# Patient Record
Sex: FEMALE | Race: WHITE | NOT HISPANIC OR LATINO | Employment: UNEMPLOYED | URBAN - METROPOLITAN AREA
[De-identification: names, ages, dates, MRNs, and addresses within clinical notes are randomized per-mention and may not be internally consistent; named-entity substitution may affect disease eponyms.]

---

## 2018-02-06 ENCOUNTER — TELEPHONE (OUTPATIENT)
Dept: SMOKING CESSATION | Facility: CLINIC | Age: 48
End: 2018-02-06

## 2018-02-06 NOTE — TELEPHONE ENCOUNTER
Called patient to offer services through [facility]. No answer, left message to call back at [phone] if interested.

## 2018-10-15 ENCOUNTER — TELEPHONE (OUTPATIENT)
Dept: ORTHOPEDICS | Facility: CLINIC | Age: 48
End: 2018-10-15

## 2018-10-15 NOTE — TELEPHONE ENCOUNTER
----- Message from Nicole Leary sent at 10/15/2018  4:52 PM CDT -----  Contact: self  Patient needs first available appointment due to [facility] 10/15/18 dx lump to left leg. Please call patient at [phone]. Thanks!

## 2018-10-15 NOTE — TELEPHONE ENCOUNTER
Spoke with patient regarding pain and bump to foot. Patient stated that she has a bump on her foot as the result of her dog stepping on her foot 1 month ago. Bump has been present since incident and is painful. Appointment with Dr De Souza has been set. Patient indicated understanding of information provided to her. No further issues discussed.

## 2019-01-08 ENCOUNTER — TELEPHONE (OUTPATIENT)
Dept: ORTHOPEDICS | Facility: CLINIC | Age: 49
End: 2019-01-08

## 2019-01-08 DIAGNOSIS — M25.572 CHRONIC PAIN OF LEFT ANKLE: Primary | ICD-10-CM

## 2019-01-08 DIAGNOSIS — G89.29 CHRONIC PAIN OF LEFT ANKLE: Primary | ICD-10-CM

## 2019-01-08 NOTE — TELEPHONE ENCOUNTER
Patient coming for Left ankle pain. Advised new images would need to be completed prior. Patient verbalized understanding. No other issues discussed

## 2019-01-24 ENCOUNTER — OFFICE VISIT (OUTPATIENT)
Dept: ORTHOPEDICS | Facility: CLINIC | Age: 49
End: 2019-01-24
Payer: MEDICAID

## 2019-01-24 VITALS
DIASTOLIC BLOOD PRESSURE: 78 MMHG | BODY MASS INDEX: 24.13 KG/M2 | SYSTOLIC BLOOD PRESSURE: 127 MMHG | HEART RATE: 87 BPM | WEIGHT: 153.75 LBS | HEIGHT: 67 IN

## 2019-01-24 DIAGNOSIS — M25.571 ACUTE RIGHT ANKLE PAIN: Primary | ICD-10-CM

## 2019-01-24 PROCEDURE — 99213 OFFICE O/P EST LOW 20 MIN: CPT | Mod: PBBFAC,PN | Performed by: ORTHOPAEDIC SURGERY

## 2019-01-24 PROCEDURE — 99204 PR OFFICE/OUTPT VISIT, NEW, LEVL IV, 45-59 MIN: ICD-10-PCS | Mod: S$PBB,,, | Performed by: ORTHOPAEDIC SURGERY

## 2019-01-24 PROCEDURE — 99999 PR PBB SHADOW E&M-EST. PATIENT-LVL III: ICD-10-PCS | Mod: PBBFAC,,, | Performed by: ORTHOPAEDIC SURGERY

## 2019-01-24 PROCEDURE — 99999 PR PBB SHADOW E&M-EST. PATIENT-LVL III: CPT | Mod: PBBFAC,,, | Performed by: ORTHOPAEDIC SURGERY

## 2019-01-24 PROCEDURE — 99204 OFFICE O/P NEW MOD 45 MIN: CPT | Mod: S$PBB,,, | Performed by: ORTHOPAEDIC SURGERY

## 2019-01-24 NOTE — LETTER
January 28, 2019      Edmond Morgan MD  [address]  [address]  [facility] [address]           [facility]  Orthopedics  [address] [place], [facility] [address]  [facility] [address]  Phone: [phone]  Fax: [phone]          Patient: Giovana Sue   MR Number: [id number]   YOB: 1970   Date of Visit: 1/24/2019       Dear Dr. Edmond Morgan:    Thank you for referring Giovana Sue to me for evaluation. Attached you will find relevant portions of my assessment and plan of care.    If you have questions, please do not hesitate to call me. I look forward to following Giovana Sue along with you.    Sincerely,    Oumar De Souza MD    Enclosure  CC:  No Recipients    If you would like to receive this communication electronically, please contact [email or web address] or [phone] to request more information on [employer] Link access.    For providers and/or their staff who would like to refer a patient to [facility], please contact us through our one-stop-shop provider referral line, [facility], at [phone].    If you feel you have received this communication in error or would no longer like to receive these types of communications, please e-mail [email or web address]

## 2019-02-14 NOTE — PROGRESS NOTES
"Orthopaedic Surgery History and Physical     History of present illness:   Giovana Sue is a 48 y.o. female who presents with RIGHT ankle pain.    Allergies:   Review of patient's allergies indicates:   Allergen Reactions    Darvocet a500 [propoxyphene n-acetaminophen] Nausea And Vomiting    Tylenol [acetaminophen] Hives       Past medical history:   Past Medical History:   Diagnosis Date    Arthritis     Dry eyes     Seizures     dx age 18-20's    Substance abuse     cocaine,crystal       Past surgical history:  Past Surgical History:   Procedure Laterality Date     SECTION      HERNIA REPAIR      right inguinal hernia    right rotatot cuff         Social history:   Reviewed per EPIC history for tobacco or alcohol use     Medications:    Current Outpatient Medications:     methadone (METHADOSE) 40 mg disintegrating tablet, Take 90 mg by mouth once daily. , Disp: , Rfl:     etodolac (LODINE) 200 MG Cap, Take 1 capsule (200 mg total) by mouth 3 (three) times daily., Disp: 30 capsule, Rfl: 0    Review of systems:  Denies chest pain, palpitations, shortness of breath.   Denies excessive thirst, urination or heat or cold intolerance.   Denies nausea, vomiting, melena or hematochezia.    Denies fever, chills, night sweats, weight loss.    Denies dysuria or hematuria.   Denies history of anxiety or depression.   Denies any skin abnormalities or rash.   Denies upper or lower extremity paresthesias or lightheadedness.    Denies cough, shortness of breath or hemoptysis.     Physical Exam:   Vitals:    19 1019   BP: 127/78   Pulse: 87   Weight: 69.7 kg (153 lb 12.3 oz)   Height: 5' 7" (1.702 m)     Awake/alert/oriented x3, No acute distress, Afebrile, Vital signs stable  Normocephalic, Atraumatic  Heart is beating at normal rate  Good inspiratory effort with unlaboured breathing  Abdomen soft/nondistended/nontender    Right lower extremity  Motor intact L2-S1  Sensation intact L2-S2  2+ " popliteal/dorsalis pedis/posterior tibial pulses  <2s CR refill to digits          Imaging:  Radiographs of the RIGHT ankle demonstrate no fracture/dislocation    Assessment:   48 y.o. female with RIGHT ankle pain    Plan:   Activity as tolerated  RTC prmichael De Souza MD  [facility]

## 2023-07-26 ENCOUNTER — HOSPITAL ENCOUNTER (OUTPATIENT)
Dept: RADIOLOGY | Facility: HOSPITAL | Age: 53
Discharge: HOME | End: 2023-07-26
Attending: NURSE PRACTITIONER
Payer: MEDICAID

## 2023-07-26 DIAGNOSIS — Z12.31 ENCOUNTER FOR SCREENING MAMMOGRAM FOR MALIGNANT NEOPLASM OF BREAST: ICD-10-CM

## 2023-07-26 PROCEDURE — 77067 MAMMO DIGITAL SCREENING BILAT WITH TOMO: ICD-10-PCS | Mod: 26,,, | Performed by: RADIOLOGY

## 2023-07-26 PROCEDURE — 77063 MAMMO DIGITAL SCREENING BILAT WITH TOMO: ICD-10-PCS | Mod: 26,,, | Performed by: RADIOLOGY

## 2023-07-26 PROCEDURE — 77067 SCR MAMMO BI INCL CAD: CPT | Mod: TC

## 2023-07-26 PROCEDURE — 77063 BREAST TOMOSYNTHESIS BI: CPT | Mod: 26,,, | Performed by: RADIOLOGY

## 2023-07-26 PROCEDURE — 77067 SCR MAMMO BI INCL CAD: CPT | Mod: 26,,, | Performed by: RADIOLOGY
